# Patient Record
Sex: MALE | Race: WHITE | NOT HISPANIC OR LATINO | Employment: UNEMPLOYED | URBAN - METROPOLITAN AREA
[De-identification: names, ages, dates, MRNs, and addresses within clinical notes are randomized per-mention and may not be internally consistent; named-entity substitution may affect disease eponyms.]

---

## 2019-11-24 ENCOUNTER — HOSPITAL ENCOUNTER (OUTPATIENT)
Facility: MEDICAL CENTER | Age: 54
End: 2019-11-26
Attending: EMERGENCY MEDICINE | Admitting: SURGERY
Payer: COMMERCIAL

## 2019-11-24 ENCOUNTER — APPOINTMENT (OUTPATIENT)
Dept: RADIOLOGY | Facility: MEDICAL CENTER | Age: 54
End: 2019-11-24
Attending: EMERGENCY MEDICINE
Payer: COMMERCIAL

## 2019-11-24 DIAGNOSIS — G89.18 ACUTE POST-OPERATIVE PAIN: ICD-10-CM

## 2019-11-24 DIAGNOSIS — S82.831A: ICD-10-CM

## 2019-11-24 DIAGNOSIS — S82.244A NONDISPLACED SPIRAL FRACTURE OF SHAFT OF RIGHT TIBIA, INITIAL ENCOUNTER FOR CLOSED FRACTURE: ICD-10-CM

## 2019-11-24 PROCEDURE — 96376 TX/PRO/DX INJ SAME DRUG ADON: CPT

## 2019-11-24 PROCEDURE — 96374 THER/PROPH/DIAG INJ IV PUSH: CPT

## 2019-11-24 PROCEDURE — 700111 HCHG RX REV CODE 636 W/ 250 OVERRIDE (IP): Performed by: EMERGENCY MEDICINE

## 2019-11-24 PROCEDURE — 73590 X-RAY EXAM OF LOWER LEG: CPT | Mod: RT

## 2019-11-24 PROCEDURE — 96375 TX/PRO/DX INJ NEW DRUG ADDON: CPT

## 2019-11-24 PROCEDURE — 99285 EMERGENCY DEPT VISIT HI MDM: CPT

## 2019-11-24 RX ORDER — ONDANSETRON 2 MG/ML
4 INJECTION INTRAMUSCULAR; INTRAVENOUS EVERY 4 HOURS PRN
Status: DISCONTINUED | OUTPATIENT
Start: 2019-11-24 | End: 2019-11-26 | Stop reason: HOSPADM

## 2019-11-24 RX ORDER — OXYCODONE HYDROCHLORIDE 10 MG/1
10 TABLET ORAL
Status: DISCONTINUED | OUTPATIENT
Start: 2019-11-24 | End: 2019-11-26 | Stop reason: HOSPADM

## 2019-11-24 RX ORDER — MORPHINE SULFATE 4 MG/ML
4 INJECTION, SOLUTION INTRAMUSCULAR; INTRAVENOUS ONCE
Status: COMPLETED | OUTPATIENT
Start: 2019-11-24 | End: 2019-11-24

## 2019-11-24 RX ORDER — CELECOXIB 200 MG/1
200 CAPSULE ORAL 2 TIMES DAILY
Status: DISCONTINUED | OUTPATIENT
Start: 2019-11-24 | End: 2019-11-26 | Stop reason: HOSPADM

## 2019-11-24 RX ORDER — ACETAMINOPHEN 500 MG
1000 TABLET ORAL EVERY 6 HOURS
Status: DISCONTINUED | OUTPATIENT
Start: 2019-11-25 | End: 2019-11-26 | Stop reason: HOSPADM

## 2019-11-24 RX ORDER — ONDANSETRON 2 MG/ML
4 INJECTION INTRAMUSCULAR; INTRAVENOUS ONCE
Status: COMPLETED | OUTPATIENT
Start: 2019-11-24 | End: 2019-11-24

## 2019-11-24 RX ORDER — IBUPROFEN 800 MG/1
800 TABLET ORAL
Status: ON HOLD | COMMUNITY
End: 2019-11-26

## 2019-11-24 RX ORDER — OXYCODONE HYDROCHLORIDE 5 MG/1
5 TABLET ORAL
Status: DISCONTINUED | OUTPATIENT
Start: 2019-11-24 | End: 2019-11-26 | Stop reason: HOSPADM

## 2019-11-24 RX ADMIN — ONDANSETRON 4 MG: 2 INJECTION INTRAMUSCULAR; INTRAVENOUS at 22:35

## 2019-11-24 RX ADMIN — MORPHINE SULFATE 4 MG: 4 INJECTION INTRAVENOUS at 22:36

## 2019-11-24 RX ADMIN — MORPHINE SULFATE 4 MG: 4 INJECTION INTRAVENOUS at 23:17

## 2019-11-24 SDOH — HEALTH STABILITY: MENTAL HEALTH: HOW MANY STANDARD DRINKS CONTAINING ALCOHOL DO YOU HAVE ON A TYPICAL DAY?: 3 OR 4

## 2019-11-24 SDOH — HEALTH STABILITY: MENTAL HEALTH: HOW OFTEN DO YOU HAVE 6 OR MORE DRINKS ON ONE OCCASION?: MONTHLY

## 2019-11-24 SDOH — HEALTH STABILITY: MENTAL HEALTH: HOW OFTEN DO YOU HAVE A DRINK CONTAINING ALCOHOL?: 2-3 TIMES A WEEK

## 2019-11-25 ENCOUNTER — ANESTHESIA EVENT (OUTPATIENT)
Dept: SURGERY | Facility: MEDICAL CENTER | Age: 54
End: 2019-11-25
Payer: COMMERCIAL

## 2019-11-25 ENCOUNTER — APPOINTMENT (OUTPATIENT)
Dept: RADIOLOGY | Facility: MEDICAL CENTER | Age: 54
End: 2019-11-25
Attending: ORTHOPAEDIC SURGERY
Payer: COMMERCIAL

## 2019-11-25 ENCOUNTER — ANESTHESIA (OUTPATIENT)
Dept: SURGERY | Facility: MEDICAL CENTER | Age: 54
End: 2019-11-25
Payer: COMMERCIAL

## 2019-11-25 LAB
ANION GAP SERPL CALC-SCNC: 10 MMOL/L (ref 0–11.9)
BASOPHILS # BLD AUTO: 0.3 % (ref 0–1.8)
BASOPHILS # BLD: 0.04 K/UL (ref 0–0.12)
BUN SERPL-MCNC: 13 MG/DL (ref 8–22)
CALCIUM SERPL-MCNC: 8.6 MG/DL (ref 8.5–10.5)
CHLORIDE SERPL-SCNC: 106 MMOL/L (ref 96–112)
CO2 SERPL-SCNC: 22 MMOL/L (ref 20–33)
CREAT SERPL-MCNC: 1.05 MG/DL (ref 0.5–1.4)
EOSINOPHIL # BLD AUTO: 0.11 K/UL (ref 0–0.51)
EOSINOPHIL NFR BLD: 0.7 % (ref 0–6.9)
ERYTHROCYTE [DISTWIDTH] IN BLOOD BY AUTOMATED COUNT: 47 FL (ref 35.9–50)
GLUCOSE SERPL-MCNC: 113 MG/DL (ref 65–99)
HCT VFR BLD AUTO: 48.8 % (ref 42–52)
HGB BLD-MCNC: 16 G/DL (ref 14–18)
IMM GRANULOCYTES # BLD AUTO: 0.09 K/UL (ref 0–0.11)
IMM GRANULOCYTES NFR BLD AUTO: 0.6 % (ref 0–0.9)
INR PPP: 0.94 (ref 0.87–1.13)
LYMPHOCYTES # BLD AUTO: 2.12 K/UL (ref 1–4.8)
LYMPHOCYTES NFR BLD: 14.1 % (ref 22–41)
MCH RBC QN AUTO: 31.4 PG (ref 27–33)
MCHC RBC AUTO-ENTMCNC: 32.8 G/DL (ref 33.7–35.3)
MCV RBC AUTO: 95.9 FL (ref 81.4–97.8)
MONOCYTES # BLD AUTO: 1.05 K/UL (ref 0–0.85)
MONOCYTES NFR BLD AUTO: 7 % (ref 0–13.4)
NEUTROPHILS # BLD AUTO: 11.58 K/UL (ref 1.82–7.42)
NEUTROPHILS NFR BLD: 77.3 % (ref 44–72)
NRBC # BLD AUTO: 0 K/UL
NRBC BLD-RTO: 0 /100 WBC
PLATELET # BLD AUTO: 264 K/UL (ref 164–446)
PMV BLD AUTO: 9.5 FL (ref 9–12.9)
POTASSIUM SERPL-SCNC: 3.7 MMOL/L (ref 3.6–5.5)
PROTHROMBIN TIME: 12.8 SEC (ref 12–14.6)
RBC # BLD AUTO: 5.09 M/UL (ref 4.7–6.1)
SODIUM SERPL-SCNC: 138 MMOL/L (ref 135–145)
WBC # BLD AUTO: 15 K/UL (ref 4.8–10.8)

## 2019-11-25 PROCEDURE — A6222 GAUZE <=16 IN NO W/SAL W/O B: HCPCS | Performed by: ORTHOPAEDIC SURGERY

## 2019-11-25 PROCEDURE — 500054 HCHG BANDAGE, ELASTIC 6: Performed by: ORTHOPAEDIC SURGERY

## 2019-11-25 PROCEDURE — 160035 HCHG PACU - 1ST 60 MINS PHASE I: Performed by: ORTHOPAEDIC SURGERY

## 2019-11-25 PROCEDURE — 501838 HCHG SUTURE GENERAL: Performed by: ORTHOPAEDIC SURGERY

## 2019-11-25 PROCEDURE — A9270 NON-COVERED ITEM OR SERVICE: HCPCS | Performed by: ANESTHESIOLOGY

## 2019-11-25 PROCEDURE — 160048 HCHG OR STATISTICAL LEVEL 1-5: Performed by: ORTHOPAEDIC SURGERY

## 2019-11-25 PROCEDURE — 302875 HCHG BANDAGE ACE 4 OR 6""

## 2019-11-25 PROCEDURE — 73590 X-RAY EXAM OF LOWER LEG: CPT | Mod: RT

## 2019-11-25 PROCEDURE — 90715 TDAP VACCINE 7 YRS/> IM: CPT | Performed by: EMERGENCY MEDICINE

## 2019-11-25 PROCEDURE — C1713 ANCHOR/SCREW BN/BN,TIS/BN: HCPCS | Performed by: ORTHOPAEDIC SURGERY

## 2019-11-25 PROCEDURE — 110371 HCHG SHELL REV 272: Performed by: ORTHOPAEDIC SURGERY

## 2019-11-25 PROCEDURE — 160041 HCHG SURGERY MINUTES - EA ADDL 1 MIN LEVEL 4: Performed by: ORTHOPAEDIC SURGERY

## 2019-11-25 PROCEDURE — G0378 HOSPITAL OBSERVATION PER HR: HCPCS

## 2019-11-25 PROCEDURE — 700102 HCHG RX REV CODE 250 W/ 637 OVERRIDE(OP): Performed by: SURGERY

## 2019-11-25 PROCEDURE — 85025 COMPLETE CBC W/AUTO DIFF WBC: CPT

## 2019-11-25 PROCEDURE — 51798 US URINE CAPACITY MEASURE: CPT

## 2019-11-25 PROCEDURE — 700111 HCHG RX REV CODE 636 W/ 250 OVERRIDE (IP): Performed by: ANESTHESIOLOGY

## 2019-11-25 PROCEDURE — 700105 HCHG RX REV CODE 258: Performed by: ANESTHESIOLOGY

## 2019-11-25 PROCEDURE — 85610 PROTHROMBIN TIME: CPT

## 2019-11-25 PROCEDURE — 29515 APPLICATION SHORT LEG SPLINT: CPT

## 2019-11-25 PROCEDURE — 500881 HCHG PACK, EXTREMITY: Performed by: ORTHOPAEDIC SURGERY

## 2019-11-25 PROCEDURE — 503036 HCHG GUIDE PIN,OIC: Performed by: ORTHOPAEDIC SURGERY

## 2019-11-25 PROCEDURE — 160036 HCHG PACU - EA ADDL 30 MINS PHASE I: Performed by: ORTHOPAEDIC SURGERY

## 2019-11-25 PROCEDURE — 700101 HCHG RX REV CODE 250: Performed by: ANESTHESIOLOGY

## 2019-11-25 PROCEDURE — 700102 HCHG RX REV CODE 250 W/ 637 OVERRIDE(OP): Performed by: ANESTHESIOLOGY

## 2019-11-25 PROCEDURE — 160002 HCHG RECOVERY MINUTES (STAT): Performed by: ORTHOPAEDIC SURGERY

## 2019-11-25 PROCEDURE — 80048 BASIC METABOLIC PNL TOTAL CA: CPT

## 2019-11-25 PROCEDURE — 160029 HCHG SURGERY MINUTES - 1ST 30 MINS LEVEL 4: Performed by: ORTHOPAEDIC SURGERY

## 2019-11-25 PROCEDURE — 160022 HCHG BLOCK: Performed by: ORTHOPAEDIC SURGERY

## 2019-11-25 PROCEDURE — A6402 STERILE GAUZE <= 16 SQ IN: HCPCS | Performed by: ORTHOPAEDIC SURGERY

## 2019-11-25 PROCEDURE — 160009 HCHG ANES TIME/MIN: Performed by: ORTHOPAEDIC SURGERY

## 2019-11-25 PROCEDURE — 90471 IMMUNIZATION ADMIN: CPT

## 2019-11-25 PROCEDURE — 700111 HCHG RX REV CODE 636 W/ 250 OVERRIDE (IP): Performed by: EMERGENCY MEDICINE

## 2019-11-25 PROCEDURE — A9270 NON-COVERED ITEM OR SERVICE: HCPCS | Performed by: SURGERY

## 2019-11-25 DEVICE — SCREW CROSS LOCK 5MM X 60MM (4TX5=20): Type: IMPLANTABLE DEVICE | Site: TIBIA | Status: FUNCTIONAL

## 2019-11-25 DEVICE — SCREW CROSS LOCK 5MM X 70MM (4TX5=20): Type: IMPLANTABLE DEVICE | Site: TIBIA | Status: FUNCTIONAL

## 2019-11-25 DEVICE — SCREW CANN 4.0X44 SHORT OIC (3TX3+2TX2=13): Type: IMPLANTABLE DEVICE | Site: TIBIA | Status: FUNCTIONAL

## 2019-11-25 DEVICE — IMPLANTABLE DEVICE: Type: IMPLANTABLE DEVICE | Site: TIBIA | Status: FUNCTIONAL

## 2019-11-25 DEVICE — NAIL TIBIAL 10MM X 340MM (2TX1=2): Type: IMPLANTABLE DEVICE | Site: TIBIA | Status: FUNCTIONAL

## 2019-11-25 RX ORDER — OXYCODONE HCL 5 MG/5 ML
10 SOLUTION, ORAL ORAL
Status: COMPLETED | OUTPATIENT
Start: 2019-11-25 | End: 2019-11-25

## 2019-11-25 RX ORDER — LIDOCAINE HYDROCHLORIDE 20 MG/ML
INJECTION, SOLUTION EPIDURAL; INFILTRATION; INTRACAUDAL; PERINEURAL PRN
Status: DISCONTINUED | OUTPATIENT
Start: 2019-11-25 | End: 2019-11-25 | Stop reason: SURG

## 2019-11-25 RX ORDER — HYDROMORPHONE HYDROCHLORIDE 1 MG/ML
0.4 INJECTION, SOLUTION INTRAMUSCULAR; INTRAVENOUS; SUBCUTANEOUS
Status: DISCONTINUED | OUTPATIENT
Start: 2019-11-25 | End: 2019-11-25 | Stop reason: HOSPADM

## 2019-11-25 RX ORDER — MEPERIDINE HYDROCHLORIDE 25 MG/ML
12.5 INJECTION INTRAMUSCULAR; INTRAVENOUS; SUBCUTANEOUS
Status: DISCONTINUED | OUTPATIENT
Start: 2019-11-25 | End: 2019-11-25 | Stop reason: HOSPADM

## 2019-11-25 RX ORDER — CEFAZOLIN SODIUM 1 G/3ML
INJECTION, POWDER, FOR SOLUTION INTRAMUSCULAR; INTRAVENOUS PRN
Status: DISCONTINUED | OUTPATIENT
Start: 2019-11-25 | End: 2019-11-25 | Stop reason: SURG

## 2019-11-25 RX ORDER — OXYCODONE HCL 5 MG/5 ML
5 SOLUTION, ORAL ORAL
Status: COMPLETED | OUTPATIENT
Start: 2019-11-25 | End: 2019-11-25

## 2019-11-25 RX ORDER — SODIUM CHLORIDE, SODIUM GLUCONATE, SODIUM ACETATE, POTASSIUM CHLORIDE AND MAGNESIUM CHLORIDE 526; 502; 368; 37; 30 MG/100ML; MG/100ML; MG/100ML; MG/100ML; MG/100ML
500 INJECTION, SOLUTION INTRAVENOUS CONTINUOUS
Status: DISCONTINUED | OUTPATIENT
Start: 2019-11-25 | End: 2019-11-25 | Stop reason: HOSPADM

## 2019-11-25 RX ORDER — HYDROMORPHONE HYDROCHLORIDE 1 MG/ML
0.2 INJECTION, SOLUTION INTRAMUSCULAR; INTRAVENOUS; SUBCUTANEOUS
Status: DISCONTINUED | OUTPATIENT
Start: 2019-11-25 | End: 2019-11-25 | Stop reason: HOSPADM

## 2019-11-25 RX ORDER — HALOPERIDOL 5 MG/ML
1 INJECTION INTRAMUSCULAR
Status: DISCONTINUED | OUTPATIENT
Start: 2019-11-25 | End: 2019-11-25 | Stop reason: HOSPADM

## 2019-11-25 RX ORDER — ROPIVACAINE HYDROCHLORIDE 5 MG/ML
INJECTION, SOLUTION EPIDURAL; INFILTRATION; PERINEURAL PRN
Status: DISCONTINUED | OUTPATIENT
Start: 2019-11-25 | End: 2019-11-25 | Stop reason: SURG

## 2019-11-25 RX ORDER — MIDAZOLAM HYDROCHLORIDE 1 MG/ML
INJECTION INTRAMUSCULAR; INTRAVENOUS PRN
Status: DISCONTINUED | OUTPATIENT
Start: 2019-11-25 | End: 2019-11-25 | Stop reason: SURG

## 2019-11-25 RX ORDER — IPRATROPIUM BROMIDE AND ALBUTEROL SULFATE 2.5; .5 MG/3ML; MG/3ML
3 SOLUTION RESPIRATORY (INHALATION)
Status: DISCONTINUED | OUTPATIENT
Start: 2019-11-25 | End: 2019-11-25 | Stop reason: HOSPADM

## 2019-11-25 RX ORDER — LIDOCAINE HYDROCHLORIDE 40 MG/ML
SOLUTION TOPICAL PRN
Status: DISCONTINUED | OUTPATIENT
Start: 2019-11-25 | End: 2019-11-25 | Stop reason: SURG

## 2019-11-25 RX ORDER — SODIUM CHLORIDE, SODIUM LACTATE, POTASSIUM CHLORIDE, CALCIUM CHLORIDE 600; 310; 30; 20 MG/100ML; MG/100ML; MG/100ML; MG/100ML
INJECTION, SOLUTION INTRAVENOUS
Status: DISCONTINUED | OUTPATIENT
Start: 2019-11-25 | End: 2019-11-25 | Stop reason: SURG

## 2019-11-25 RX ORDER — ONDANSETRON 2 MG/ML
4 INJECTION INTRAMUSCULAR; INTRAVENOUS
Status: DISCONTINUED | OUTPATIENT
Start: 2019-11-25 | End: 2019-11-25 | Stop reason: HOSPADM

## 2019-11-25 RX ORDER — HYDROMORPHONE HYDROCHLORIDE 1 MG/ML
1 INJECTION, SOLUTION INTRAMUSCULAR; INTRAVENOUS; SUBCUTANEOUS
Status: DISCONTINUED | OUTPATIENT
Start: 2019-11-25 | End: 2019-11-25 | Stop reason: HOSPADM

## 2019-11-25 RX ORDER — DEXAMETHASONE SODIUM PHOSPHATE 4 MG/ML
INJECTION, SOLUTION INTRA-ARTICULAR; INTRALESIONAL; INTRAMUSCULAR; INTRAVENOUS; SOFT TISSUE PRN
Status: DISCONTINUED | OUTPATIENT
Start: 2019-11-25 | End: 2019-11-25 | Stop reason: SURG

## 2019-11-25 RX ORDER — MIDAZOLAM HYDROCHLORIDE 1 MG/ML
1 INJECTION INTRAMUSCULAR; INTRAVENOUS
Status: DISCONTINUED | OUTPATIENT
Start: 2019-11-25 | End: 2019-11-25 | Stop reason: HOSPADM

## 2019-11-25 RX ORDER — DIPHENHYDRAMINE HYDROCHLORIDE 50 MG/ML
12.5 INJECTION INTRAMUSCULAR; INTRAVENOUS
Status: DISCONTINUED | OUTPATIENT
Start: 2019-11-25 | End: 2019-11-25 | Stop reason: HOSPADM

## 2019-11-25 RX ORDER — ESMOLOL HYDROCHLORIDE 10 MG/ML
INJECTION INTRAVENOUS PRN
Status: DISCONTINUED | OUTPATIENT
Start: 2019-11-25 | End: 2019-11-25 | Stop reason: SURG

## 2019-11-25 RX ORDER — SODIUM CHLORIDE, SODIUM GLUCONATE, SODIUM ACETATE, POTASSIUM CHLORIDE AND MAGNESIUM CHLORIDE 526; 502; 368; 37; 30 MG/100ML; MG/100ML; MG/100ML; MG/100ML; MG/100ML
INJECTION, SOLUTION INTRAVENOUS
Status: DISCONTINUED | OUTPATIENT
Start: 2019-11-25 | End: 2019-11-25 | Stop reason: SURG

## 2019-11-25 RX ADMIN — OXYCODONE HYDROCHLORIDE 5 MG: 5 TABLET ORAL at 15:59

## 2019-11-25 RX ADMIN — FENTANYL CITRATE 50 MCG: 50 INJECTION, SOLUTION INTRAMUSCULAR; INTRAVENOUS at 12:23

## 2019-11-25 RX ADMIN — HYDROMORPHONE HYDROCHLORIDE 0.2 MG: 1 INJECTION, SOLUTION INTRAMUSCULAR; INTRAVENOUS; SUBCUTANEOUS at 13:55

## 2019-11-25 RX ADMIN — CLOSTRIDIUM TETANI TOXOID ANTIGEN (FORMALDEHYDE INACTIVATED), CORYNEBACTERIUM DIPHTHERIAE TOXOID ANTIGEN (FORMALDEHYDE INACTIVATED), BORDETELLA PERTUSSIS TOXOID ANTIGEN (GLUTARALDEHYDE INACTIVATED), BORDETELLA PERTUSSIS FILAMENTOUS HEMAGGLUTININ ANTIGEN (FORMALDEHYDE INACTIVATED), BORDETELLA PERTUSSIS PERTACTIN ANTIGEN, AND BORDETELLA PERTUSSIS FIMBRIAE 2/3 ANTIGEN 0.5 ML: 5; 2; 2.5; 5; 3; 5 INJECTION, SUSPENSION INTRAMUSCULAR at 04:05

## 2019-11-25 RX ADMIN — CEFAZOLIN 2 G: 330 INJECTION, POWDER, FOR SOLUTION INTRAMUSCULAR; INTRAVENOUS at 11:36

## 2019-11-25 RX ADMIN — FENTANYL CITRATE 25 MCG: 0.05 INJECTION, SOLUTION INTRAMUSCULAR; INTRAVENOUS at 13:10

## 2019-11-25 RX ADMIN — MIDAZOLAM 2 MG: 1 INJECTION INTRAMUSCULAR; INTRAVENOUS at 11:38

## 2019-11-25 RX ADMIN — HYDROMORPHONE HYDROCHLORIDE 0.2 MG: 1 INJECTION, SOLUTION INTRAMUSCULAR; INTRAVENOUS; SUBCUTANEOUS at 13:45

## 2019-11-25 RX ADMIN — CELECOXIB 200 MG: 200 CAPSULE ORAL at 17:32

## 2019-11-25 RX ADMIN — ACETAMINOPHEN 1000 MG: 500 TABLET ORAL at 21:27

## 2019-11-25 RX ADMIN — FENTANYL CITRATE 25 MCG: 0.05 INJECTION, SOLUTION INTRAMUSCULAR; INTRAVENOUS at 13:05

## 2019-11-25 RX ADMIN — ACETAMINOPHEN 1000 MG: 500 TABLET ORAL at 14:50

## 2019-11-25 RX ADMIN — HYDROMORPHONE HYDROCHLORIDE 0.2 MG: 1 INJECTION, SOLUTION INTRAMUSCULAR; INTRAVENOUS; SUBCUTANEOUS at 13:40

## 2019-11-25 RX ADMIN — HYDROMORPHONE HYDROCHLORIDE 0.2 MG: 1 INJECTION, SOLUTION INTRAMUSCULAR; INTRAVENOUS; SUBCUTANEOUS at 13:50

## 2019-11-25 RX ADMIN — ROCURONIUM BROMIDE 50 MG: 10 INJECTION, SOLUTION INTRAVENOUS at 11:48

## 2019-11-25 RX ADMIN — OXYCODONE HYDROCHLORIDE 5 MG: 5 TABLET ORAL at 01:10

## 2019-11-25 RX ADMIN — ACETAMINOPHEN 1000 MG: 500 TABLET ORAL at 01:10

## 2019-11-25 RX ADMIN — SODIUM CHLORIDE, POTASSIUM CHLORIDE, SODIUM LACTATE AND CALCIUM CHLORIDE: 600; 310; 30; 20 INJECTION, SOLUTION INTRAVENOUS at 11:36

## 2019-11-25 RX ADMIN — LIDOCAINE HYDROCHLORIDE 4 ML: 40 SOLUTION TOPICAL at 11:48

## 2019-11-25 RX ADMIN — OXYCODONE HYDROCHLORIDE 5 MG: 5 TABLET ORAL at 04:09

## 2019-11-25 RX ADMIN — ESMOLOL HYDROCHLORIDE 50 MG: 100 INJECTION, SOLUTION INTRAVENOUS at 11:50

## 2019-11-25 RX ADMIN — HYDROMORPHONE HYDROCHLORIDE 0.2 MG: 1 INJECTION, SOLUTION INTRAMUSCULAR; INTRAVENOUS; SUBCUTANEOUS at 13:20

## 2019-11-25 RX ADMIN — LIDOCAINE HYDROCHLORIDE 50 MG: 20 INJECTION, SOLUTION EPIDURAL; INFILTRATION; INTRACAUDAL at 11:48

## 2019-11-25 RX ADMIN — HYDROMORPHONE HYDROCHLORIDE 0.2 MG: 1 INJECTION, SOLUTION INTRAMUSCULAR; INTRAVENOUS; SUBCUTANEOUS at 13:15

## 2019-11-25 RX ADMIN — ACETAMINOPHEN 1000 MG: 500 TABLET ORAL at 06:34

## 2019-11-25 RX ADMIN — HYDROMORPHONE HYDROCHLORIDE 0.2 MG: 1 INJECTION, SOLUTION INTRAMUSCULAR; INTRAVENOUS; SUBCUTANEOUS at 13:30

## 2019-11-25 RX ADMIN — ROPIVACAINE HYDROCHLORIDE 20 ML: 5 INJECTION, SOLUTION EPIDURAL; INFILTRATION; PERINEURAL at 12:50

## 2019-11-25 RX ADMIN — SODIUM CHLORIDE, SODIUM GLUCONATE, SODIUM ACETATE, POTASSIUM CHLORIDE AND MAGNESIUM CHLORIDE: 526; 502; 368; 37; 30 INJECTION, SOLUTION INTRAVENOUS at 12:30

## 2019-11-25 RX ADMIN — OXYCODONE HYDROCHLORIDE 10 MG: 5 SOLUTION ORAL at 13:03

## 2019-11-25 RX ADMIN — FENTANYL CITRATE 50 MCG: 50 INJECTION, SOLUTION INTRAMUSCULAR; INTRAVENOUS at 13:35

## 2019-11-25 RX ADMIN — OXYCODONE HYDROCHLORIDE 10 MG: 10 TABLET ORAL at 21:27

## 2019-11-25 RX ADMIN — HYDROMORPHONE HYDROCHLORIDE 0.4 MG: 1 INJECTION, SOLUTION INTRAMUSCULAR; INTRAVENOUS; SUBCUTANEOUS at 13:25

## 2019-11-25 RX ADMIN — HYDROMORPHONE HYDROCHLORIDE 0.2 MG: 1 INJECTION, SOLUTION INTRAMUSCULAR; INTRAVENOUS; SUBCUTANEOUS at 14:00

## 2019-11-25 RX ADMIN — DEXAMETHASONE SODIUM PHOSPHATE 8 MG: 4 INJECTION, SOLUTION INTRA-ARTICULAR; INTRALESIONAL; INTRAMUSCULAR; INTRAVENOUS; SOFT TISSUE at 11:53

## 2019-11-25 RX ADMIN — PROPOFOL 150 MG: 10 INJECTION, EMULSION INTRAVENOUS at 11:48

## 2019-11-25 RX ADMIN — FENTANYL CITRATE 100 MCG: 50 INJECTION, SOLUTION INTRAMUSCULAR; INTRAVENOUS at 11:38

## 2019-11-25 ASSESSMENT — COGNITIVE AND FUNCTIONAL STATUS - GENERAL
TURNING FROM BACK TO SIDE WHILE IN FLAT BAD: A LITTLE
SUGGESTED CMS G CODE MODIFIER DAILY ACTIVITY: CI
DAILY ACTIVITIY SCORE: 23
CLIMB 3 TO 5 STEPS WITH RAILING: TOTAL
SUGGESTED CMS G CODE MODIFIER MOBILITY: CL
MOVING FROM LYING ON BACK TO SITTING ON SIDE OF FLAT BED: A LOT
DRESSING REGULAR LOWER BODY CLOTHING: A LITTLE
STANDING UP FROM CHAIR USING ARMS: A LOT
MOVING TO AND FROM BED TO CHAIR: A LITTLE
MOBILITY SCORE: 13
WALKING IN HOSPITAL ROOM: A LOT

## 2019-11-25 ASSESSMENT — PATIENT HEALTH QUESTIONNAIRE - PHQ9
1. LITTLE INTEREST OR PLEASURE IN DOING THINGS: NOT AT ALL
2. FEELING DOWN, DEPRESSED, IRRITABLE, OR HOPELESS: NOT AT ALL
SUM OF ALL RESPONSES TO PHQ9 QUESTIONS 1 AND 2: 0

## 2019-11-25 ASSESSMENT — LIFESTYLE VARIABLES
HAVE YOU EVER FELT YOU SHOULD CUT DOWN ON YOUR DRINKING: NO
EVER_SMOKED: YES
AVERAGE NUMBER OF DAYS PER WEEK YOU HAVE A DRINK CONTAINING ALCOHOL: 3
EVER HAD A DRINK FIRST THING IN THE MORNING TO STEADY YOUR NERVES TO GET RID OF A HANGOVER: NO
HAVE PEOPLE ANNOYED YOU BY CRITICIZING YOUR DRINKING: NO
TOTAL SCORE: 0
EVER FELT BAD OR GUILTY ABOUT YOUR DRINKING: NO
CONSUMPTION TOTAL: NEGATIVE
ALCOHOL_USE: YES
DOES PATIENT WANT TO STOP DRINKING: NO
HOW MANY TIMES IN THE PAST YEAR HAVE YOU HAD 5 OR MORE DRINKS IN A DAY: 0
TOTAL SCORE: 0
ON A TYPICAL DAY WHEN YOU DRINK ALCOHOL HOW MANY DRINKS DO YOU HAVE: 2
TOTAL SCORE: 0

## 2019-11-25 ASSESSMENT — PAIN SCALES - GENERAL: PAIN_LEVEL: 5

## 2019-11-25 NOTE — CONSULTS
DATE OF SERVICE:  11/25/2019    ORTHOPEDIC CONSULTATION    CHIEF COMPLAINT:  Right leg pain.    HISTORY OF PRESENT ILLNESS:  The patient is a 53-year-old gentleman who   presents with a low energy ground level fall while rollerskating in the indoor   roller skating rink locally.  He had inability to bear weight, he noticed   deformity.  He was brought to [facility] where he was   found to have a right tibia fracture.  I was consulted as the orthopedic   surgeon on-call.  The patient denies any bleeding.  He reports very   superficial abrasion about his leg.  His pain is tolerable, approximately 5/10   in severity.  It has been somewhat amenable to splinting, ice, and pain   medication.  He denies prior trauma or surgery about his right lower   extremity.  He denies any numbness to his right lower extremity, denies pain   elsewhere in his extremities.    PAST MEDICAL HISTORY:  None.    PAST SURGICAL HISTORY:  He denies any prior right lower extremity surgery.    MEDICATIONS:  Ibuprofen.    ALLERGIES:  No known drug allergies.    FAMILY HISTORY:  Negative for bleeding disorders or anesthetic reactions.    SOCIAL HISTORY:  He reports smoking one pack of cigarettes daily, drinks   alcohol daily, smokes marijuana occasionally.    REVIEW OF SYSTEMS:  Thorough review of systems is performed and is negative   except for past medical history and history of present illness.    PHYSICAL EXAMINATION:  GENERAL:  He is alert and oriented x4.  HEENT:  Normocephalic, atraumatic.  NEUROLOGIC:  Cranial nerves II-XII are grossly intact.  CARDIOVASCULAR:  2+ distal pulses.  EXTREMITIES:  No cyanosis, clubbing, or edema.  PULMONARY:  Breathing comfortably on room air without labor.  ABDOMEN:  Obese, otherwise unremarkable.  SKIN:  No rashes, jaundice, or cyanosis.  SPINE:  Clinically midline.  GAIT:  Currently nonambulatory in a hospital bed.  MUSCULOSKELETAL:  Bilateral upper extremities and left lower extremity, no    tenderness to palpation, pain with range of motion.  Right lower extremity, he   is in a well-padded splint.  He has mild tenderness to palpation about the   proximal fibula and about the proximal tibia joint line.  He has no tenderness   in his right thigh.  His right leg compartments are soft.  He has no pain   whatsoever with passive stretch of the toes.  NEUROLOGIC:  Right lower extremity, sensation intact to light touch at L4, L5,   S1 dermatomes.  He fires EHL and FHL with good strength.    IMAGING:  Multiple views of the right tibia demonstrates a distal third spiral   oblique fracture with very small posterior malleolus fracture and a proximal   fibular fracture.    ASSESSMENT:  A 53-year-old male with a right tibia -- fibula fracture.    PLAN:  Recommend admission for pain control and taken to operating room for   tibial nailing with Dr. Lund on 11/25/2019.  The patient understands this   plan and wishes to proceed.  Continue n.p.o.       ____________________________________     MD HOLLEY Chao / DIANA    DD:  11/25/2019 06:56:40  DT:  11/25/2019 08:21:17    D#:  [id number]  Job#:  [id number]

## 2019-11-25 NOTE — CONSULTS
Ortho:    Full consult dictated. Plan for OR today with Dr. Lund for tibial nailing of distal third tibia fracture with small posterior malleolus fracture and proximal fibula fracture. Patient is NPO. He understands this plan and wishes to proceed.

## 2019-11-25 NOTE — ANESTHESIA PREPROCEDURE EVALUATION
Relevant Problems   No relevant active problems       Physical Exam    Airway   Mallampati: III  TM distance: >3 FB  Neck ROM: full       Cardiovascular - normal exam  Rhythm: regular  Rate: normal  (-) murmur     Dental - normal exam         Pulmonary - normal exam  Breath sounds clear to auscultation     Abdominal    Neurological - normal exam         Other findings: Obesity, ? SUZANNE, RLE fracture            Anesthesia Plan    ASA 2       Plan - general       Airway plan will be LMA  (Post op fem nerve block for post op pain control)      Induction: intravenous    Postoperative Plan: Postoperative administration of opioids is intended.    Pertinent diagnostic labs and testing reviewed    Informed Consent:    Anesthetic plan and risks discussed with patient.    Use of blood products discussed with: patient whom consented to blood products.

## 2019-11-25 NOTE — ANESTHESIA TIME REPORT
Anesthesia Start and Stop Event Times     Date Time Event    11/25/2019 1115 Ready for Procedure     1136 Anesthesia Start     1257 Anesthesia Stop        Responsible Staff  11/25/19    Name Role Begin End    Jatin Smith III, M.D. Anesth 1136 1257        Preop Diagnosis (Free Text):  Pre-op Diagnosis     right tibial fracture        Preop Diagnosis (Codes):    Post op Diagnosis  Closed tibia fracture      Premium Reason  Non-Premium    Comments:

## 2019-11-25 NOTE — ED PROVIDER NOTES
ED Provider Note    CHIEF COMPLAINT  Chief Complaint   Patient presents with   • Leg Injury       HPI  Antoni Foster is a 53 y.o. male who presents with right lower extremity pain following a fall while at an indoor rollerskating rink.  Has pain to the distal tibial region.  Has a slight abrasion there however no protruding bone or active bleeding.  Last tetanus was about 7 years ago.  Denies any other injuries such as head injury, neck pain, back pain.  No hip pain.  No upper extremity pain.  No left lower extremity pain.  Denies any numbness or tingling distally.    REVIEW OF SYSTEMS  See HPI for further details. All other systems are negative.     PAST MEDICAL HISTORY   Denies infant past medical history such as diabetes, heart disease    SOCIAL HISTORY  Social History     Tobacco Use   • Smoking status: Current Every Day Smoker     Packs/day: 1.00     Types: Cigarettes   • Smokeless tobacco: Never Used   Substance and Sexual Activity   • Alcohol use: Yes     Alcohol/week: 2.4 oz     Types: 4 Cans of beer per week     Frequency: 2-3 times a week     Drinks per session: 3 or 4     Binge frequency: Monthly   • Drug use: Yes     Types: Inhaled     Comment: Marijuana   • Sexual activity: Not on file       SURGICAL HISTORY   has a past surgical history that includes other orthopedic surgery.    CURRENT MEDICATIONS  Home Medications     Reviewed by Carlos Alberto Mace R.N. (Registered Nurse) on 11/24/19 at 2151  Med List Status: Complete   Medication Last Dose Status   ibuprofen (MOTRIN) 800 MG Tab 11/24/2019 Active                ALLERGIES  Not on File    PHYSICAL EXAM  VITAL SIGNS: BP (!) 97/71   Pulse 98   Temp 36.4 °C (97.5 °F) (Temporal)   Ht 1.829 m (6')   Wt 115.7 kg (255 lb)   SpO2 90%   BMI 34.58 kg/m²    Pulse ox interpretation: I interpret this pulse ox as normal.  Constitutional: Alert in no apparent distress.  HENT: No signs of trauma, Bilateral external ears normal, Nose normal.   Eyes: Pupils are equal and  reactive, Conjunctiva normal, Non-icteric.   Neck: Normal range of motion, No tenderness, Supple, No stridor.   Cardiovascular: Regular rate and rhythm.   Thorax & Lungs: Normal breath sounds, No respiratory distress,  Skin: Warm, Dry, No erythema, No rash.   Back: No bony tenderness, No CVA tenderness.   Extremities: Intact distal pulses, No edema, right lower leg tenderness to the distal tibial region.  No tenderness at the medial or lateral malleoli, no tenderness along the proximal fibula, no cyanosis  Musculoskeletal: Pain with range of motion to the ankle. No major deformities noted.   Neurologic: Alert, No focal deficits noted.       DIAGNOSTIC STUDIES / PROCEDURES      LABS  Labs Reviewed   BASIC METABOLIC PANEL   CBC WITHOUT DIFFERENTIAL   PROTHROMBIN TIME       RADIOLOGY  DX-TIBIA AND FIBULA RIGHT   Final Result      1.  Acute, minimally comminuted proximal fibular diaphyseal fracture.   2.  Acute, obliquely oriented distal tibial diaphyseal fracture.   3.  Radiolucent line projecting over the posterior malleolus, which may be due to a nondisplaced posterior malleolar fracture.          COURSE & MEDICAL DECISION MAKING    Medications   tetanus-dipth-acell pertussis (ADACEL) inj 0.5 mL (has no administration in time range)   Pharmacy Consult Request ...Pain Management Review 1 Each (has no administration in time range)   acetaminophen (TYLENOL) tablet 1,000 mg (has no administration in time range)   celecoxib (CELEBREX) capsule 200 mg (has no administration in time range)   oxyCODONE immediate-release (ROXICODONE) tablet 5 mg (has no administration in time range)   oxyCODONE immediate-release (ROXICODONE) tablet 10 mg (has no administration in time range)   fentaNYL (SUBLIMAZE) injection 50 mcg (has no administration in time range)   ondansetron (ZOFRAN) syringe/vial injection 4 mg (has no administration in time range)   morphine (pf) 4 MG/ML injection 4 mg (4 mg Intravenous Given 11/24/19 3080)    ondansetron (ZOFRAN) syringe/vial injection 4 mg (4 mg Intravenous Given 11/24/19 2431)   morphine (pf) 4 MG/ML injection 4 mg (4 mg Intravenous Given 11/24/19 1407)       Pertinent Labs & Imaging studies reviewed. (See chart for details)  53 y.o. male presenting with right lower extremity pain following a fall while rollerskating at a roller rink.  Has a slight abrasion however this is not consistent with an open fracture.  Neurovascularly intact distally.    The patient is a resident of [facility].  He denies any significant medical history.  Denies chronic medication use.  Denies any known cardiovascular disease/pulmonary disease history    X-ray was performed showing distal tibial spiral fracture and proximal fibular fracture.  Spoke with Dr. Huston from orthopedic surgery and recommending admission for surgical management.    He was given a tetanus update and wound was irrigated and dressed.  Patient was also placed in a 3 sided short leg splint.  N.p.o. after midnight.  He was given IV analgesia for pain control.      BP (!) 97/71   Pulse 98   Temp 36.4 °C (97.5 °F) (Temporal)   Ht 1.829 m (6')   Wt 115.7 kg (255 lb)   SpO2 90%   BMI 34.58 kg/m²       FINAL IMPRESSION  1. Nondisplaced spiral fracture of shaft of right tibia, initial encounter for closed fracture    2. Fracture of head of fibula, right, closed, initial encounter            Electronically signed by: Johny Sim, 11/24/2019 10:18 PM

## 2019-11-25 NOTE — ED TRIAGE NOTES
Report per Pedro FREITAS: patient fell while roller skating at wife's birthday party.  Injury to right lower left with obvious deformity.  POMS intact.  Vacuum splint placed.  200mcg Fentanyl and 4mg Zofran given en route.    2/10 pain, 20G right hand, 116/70, 84% RA, 99 SR.      Patient has ha done 24oz beer this evening.

## 2019-11-25 NOTE — ANESTHESIA PROCEDURE NOTES
Airway  Performed by: Jatin Smith III, M.D.  Authorized by: Jatin Smith III, M.D.     Location:  OR  Urgency:  Elective  Difficult Airway: No    Indications for Airway Management:  Anesthesia  Spontaneous Ventilation: absent    Sedation Level:  Deep  Preoxygenated: Yes    Patient Position:  Sniffing  Final Airway Type:  Endotracheal airway  Final Endotracheal Airway:  ETT  Cuffed: Yes    Technique Used for Successful ETT Placement:  Direct laryngoscopy  Insertion Site:  Oral  Blade Type:  Tatum  Laryngoscope Blade/Videolaryngoscope Blade Size:  3  ETT Size (mm):  8.0  Measured from:  Lips  ETT to Lips (cm):  23  Placement Verified by: auscultation and capnometry    Cormack-Lehane Classification:  Grade IIb - view of arytenoids or posterior of glottis only  Number of Attempts at Approach:  1

## 2019-11-25 NOTE — ADDENDUM NOTE
Addendum  created 11/25/19 1323 by Jatin Smith III, M.D.    Child order released for a procedure order, Clinical Note Signed, Intraprocedure Blocks edited, Intraprocedure Meds edited

## 2019-11-25 NOTE — ANESTHESIA PROCEDURE NOTES
Peripheral IV  Date/Time: 11/25/2019 11:58 AM  Performed by: Jatin Smith III, M.D.  Authorized by: Jatin Smith III, M.D.     Size:  18 G  Laterality:  Left (forearm)  Local Anesthetic:  None  Site Prep:  Alcohol  Technique:  Direct puncture  Attempts:  2

## 2019-11-25 NOTE — ANESTHESIA QCDR
2019 [facility] Clinical Data Registry (for Quality Improvement)     Postoperative nausea/vomiting risk protocol (Adult = 18 yrs and Pediatric 3-17 yrs)- (430 and 463)  General inhalation anesthetic (NOT TIVA) with PONV risk factors: Yes  Provision of anti-emetic therapy with at least 2 different classes of agents: Yes   Patient DID NOT receive anti-emetic therapy and reason is documented in Medical Record:  N/A    Multimodal Pain Management- (AQI59)  Patient undergoing Elective Surgery (i.e. Outpatient, or ASC, or Prescheduled Surgery prior to Hospital Admission): No  Use of Multimodal Pain Management, two or more drugs and/or interventions, NOT including systemic opioids: N/A  Exception: Documented allergy to multiple classes of analgesics: N/A    PACU assessment of acute postoperative pain prior to Anesthesia Care End- Applies to Patients Age = 18- (ABG7)  Initial PACU pain score is which of the following: < 7/10  Patient unable to report pain score: N/A    Post-anesthetic transfer of care checklist/protocol to PACU/ICU- (426 and 427)  Upon conclusion of case, patient transferred to which of the following locations: PACU/Non-ICU  Use of transfer checklist/protocol: Yes  Exclusion: Service Performed in Patient Hospital Room (and thus did not require transfer): N/A    PACU Reintubation- (AQI31)  General anesthesia requiring endotracheal intubation (ETT) along with subsequent extubation in OR or PACU: No  Required reintubation in the PACU: N/A  Extubation was a planned trial documented in the medical record prior to removal of the original airway device: N/A    Unplanned admission to ICU related to anesthesia service up through end of PACU care- (MD51)  Unplanned admission to ICU (not initially anticipated at anesthesia start time): No

## 2019-11-25 NOTE — ANESTHESIA PROCEDURE NOTES
Peripheral Block under GA  Date/Time: 11/25/2019 12:47 PM  Performed by: Jatin Smith III, M.D.  Authorized by: Jatin Smith III, M.D.     Patient Location:  OR  Start Time:  11/25/2019 12:47 PM  End Time:  11/25/2019 12:50 PM  Reason for Block: at surgeon's request and post-op pain management    patient identified, IV checked, site marked, risks and benefits discussed, surgical consent, monitors and equipment checked, pre-op evaluation and timeout performed    Patient Position:  Supine  Prep: ChloraPrep    Monitoring:  Heart rate, continuous pulse ox and cardiac monitor  Block Region:  Lower Extremity  Lower Extremity - Block Type:  Selective FEMORAL nerve block at the Adductor Canal    Laterality:  Right  Procedures: ultrasound guided  Image captured, interpreted and electronically stored.  Local Infiltration:  Lidocaine  Strength:  1 %  Dose:  3 ml  Block Type:  Single-shot  Needle Length:  100mm  Needle Gauge:  21 G  Needle Localization:  Ultrasound guidance  Injection Assessment:  Negative aspiration for heme, no paresthesia on injection, incremental injection and local visualized surrounding nerve on ultrasound   US Guided Selective Femoral Nerve Block at Adductor Canal:   US probe placed at mid-thigh level on externally rotated leg and femur identified.  Probe directed medially until Sartorius Muscle (SM), Femoral Artery (FA) and Saphenous Nerve (SN) identified in Adductor Canal (AC).  Needle inserted anterolateral to probe in an in plane approach into a subsartorial perivascular perineural position.  After negative aspiration LA injected with ease and visualized spreading within the AC.

## 2019-11-25 NOTE — CARE PLAN
Problem: Safety  Goal: Will remain free from falls  Outcome: PROGRESSING AS EXPECTED  Note:   Patient has call light in reach, knows to call for assistance. Room uncluttered and walkways are empty.      Problem: Infection  Goal: Will remain free from infection  Outcome: PROGRESSING AS EXPECTED     Problem: Venous Thromboembolism (VTW)/Deep Vein Thrombosis (DVT) Prevention:  Goal: Patient will participate in Venous Thrombosis (VTE)/Deep Vein Thrombosis (DVT)Prevention Measures  Outcome: PROGRESSING AS EXPECTED  Note:   Patient wearing SCDs and understands the purpose.      Problem: Pain Management  Goal: Pain level will decrease to patient's comfort goal  Outcome: PROGRESSING AS EXPECTED    Note: Patient understands the need for pain medications and to stay ahead of the pain.

## 2019-11-25 NOTE — ANESTHESIA POSTPROCEDURE EVALUATION
Patient: Antoni Foster    Procedure Summary     Date:  11/25/19 Room / Location:  [place] [address] / SURGERY [facility]    Anesthesia Start:  1136 Anesthesia Stop:  1257    Procedure:  INSERTION, INTRAMEDULLARY LOVE, TIBIA (Right Leg Lower) Diagnosis:  (right tibial fracture)    Surgeon:  Andrew Lund M.D. Responsible Provider:  Jatin Smith III, M.D.    Anesthesia Type:  general ASA Status:  2          Final Anesthesia Type: general  Last vitals  BP   Blood Pressure: 117/77    Temp   36.4 °C (97.6 °F)    Pulse   Pulse: 70   Resp   18    SpO2   93 %      Anesthesia Post Evaluation    Patient location during evaluation: PACU  Patient participation: complete - patient participated  Level of consciousness: awake and alert  Pain score: 5    Airway patency: patent  Anesthetic complications: no  Cardiovascular status: hemodynamically stable  Respiratory status: acceptable  Hydration status: euvolemic    PONV: none           Nurse Pain Score: 5 (NPRS)

## 2019-11-25 NOTE — OR NURSING
Pt AA/Ox4. VSS. Dressing to RLE, CDI. CMS+. Pt wiggles right toes. Pt reports 3/10 pain scale at this time. Femoral nerve block administered by anesthesiologist. Pain medications given. RLE elevated on a pillow and ice pack applied. Pt denies nausea. SCD to LLE.    Report given to NIKOLE Castrejon. Pt's SOLucía, updated regarding plan of care.     Awaiting transport.

## 2019-11-25 NOTE — PROGRESS NOTES
2 RN Skin Check    2 RN skin check complete upon admission.   Devices in place: SCDs and Nasal Cannula.  Skin assessed under devices: yes.  No confirmed or new potential pressure ulcers noted.  The following interventions in place: Pillows and Lotion.    Skin intact overall. Blanchable redness to right knee and bilateral ears. Unable to assess right lower leg due to splint.

## 2019-11-25 NOTE — OR NURSING
Pt via bed, accompanied by transport, was transferred to [facility] at 1418.

## 2019-11-25 NOTE — CARE PLAN
Problem: Communication  Goal: The ability to communicate needs accurately and effectively will improve  Outcome: PROGRESSING AS EXPECTED   Patient able to effectively communicate needs.    Problem: Safety  Goal: Will remain free from falls  Outcome: PROGRESSING AS EXPECTED  Fall precautions in place. Patient calls appropriately for assistance.

## 2019-11-26 VITALS
DIASTOLIC BLOOD PRESSURE: 76 MMHG | RESPIRATION RATE: 16 BRPM | BODY MASS INDEX: 34.54 KG/M2 | WEIGHT: 255 LBS | SYSTOLIC BLOOD PRESSURE: 125 MMHG | HEART RATE: 72 BPM | OXYGEN SATURATION: 92 % | HEIGHT: 72 IN | TEMPERATURE: 98 F

## 2019-11-26 PROCEDURE — 97165 OT EVAL LOW COMPLEX 30 MIN: CPT

## 2019-11-26 PROCEDURE — 97161 PT EVAL LOW COMPLEX 20 MIN: CPT

## 2019-11-26 PROCEDURE — G0378 HOSPITAL OBSERVATION PER HR: HCPCS

## 2019-11-26 PROCEDURE — A9270 NON-COVERED ITEM OR SERVICE: HCPCS | Performed by: SURGERY

## 2019-11-26 PROCEDURE — 700102 HCHG RX REV CODE 250 W/ 637 OVERRIDE(OP): Performed by: SURGERY

## 2019-11-26 RX ORDER — OXYCODONE AND ACETAMINOPHEN 10; 325 MG/1; MG/1
1 TABLET ORAL EVERY 4 HOURS PRN
Qty: 80 TAB | Refills: 0 | Status: SHIPPED | OUTPATIENT
Start: 2019-11-26 | End: 2019-12-11

## 2019-11-26 RX ORDER — DOCUSATE SODIUM 100 MG/1
100 CAPSULE, LIQUID FILLED ORAL 2 TIMES DAILY
Qty: 60 CAP | Refills: 0 | Status: SHIPPED | OUTPATIENT
Start: 2019-11-26

## 2019-11-26 RX ORDER — ASPIRIN 325 MG
325 TABLET ORAL 2 TIMES DAILY
Qty: 60 TAB | Refills: 0 | Status: SHIPPED | OUTPATIENT
Start: 2019-11-26

## 2019-11-26 RX ADMIN — OXYCODONE HYDROCHLORIDE 10 MG: 10 TABLET ORAL at 10:32

## 2019-11-26 RX ADMIN — ACETAMINOPHEN 1000 MG: 500 TABLET ORAL at 08:35

## 2019-11-26 RX ADMIN — CELECOXIB 200 MG: 200 CAPSULE ORAL at 04:31

## 2019-11-26 RX ADMIN — ACETAMINOPHEN 1000 MG: 500 TABLET ORAL at 04:31

## 2019-11-26 ASSESSMENT — COGNITIVE AND FUNCTIONAL STATUS - GENERAL
MOVING TO AND FROM BED TO CHAIR: A LITTLE
MOBILITY SCORE: 18
STANDING UP FROM CHAIR USING ARMS: A LITTLE
HELP NEEDED FOR BATHING: A LITTLE
SUGGESTED CMS G CODE MODIFIER MOBILITY: CK
DAILY ACTIVITIY SCORE: 22
DRESSING REGULAR LOWER BODY CLOTHING: A LITTLE
WALKING IN HOSPITAL ROOM: A LITTLE
CLIMB 3 TO 5 STEPS WITH RAILING: A LITTLE
TURNING FROM BACK TO SIDE WHILE IN FLAT BAD: A LITTLE
MOVING FROM LYING ON BACK TO SITTING ON SIDE OF FLAT BED: A LITTLE
SUGGESTED CMS G CODE MODIFIER DAILY ACTIVITY: CJ

## 2019-11-26 ASSESSMENT — GAIT ASSESSMENTS
DEVIATION: ANTALGIC
ASSISTIVE DEVICE: FRONT WHEEL WALKER
GAIT LEVEL OF ASSIST: SUPERVISED
DISTANCE (FEET): 50

## 2019-11-26 ASSESSMENT — ACTIVITIES OF DAILY LIVING (ADL): TOILETING: INDEPENDENT

## 2019-11-26 NOTE — CARE PLAN
Problem: Communication  Goal: The ability to communicate needs accurately and effectively will improve  Outcome: PROGRESSING AS EXPECTED  Note:   Patient able to voice needs.      Problem: Safety  Goal: Will remain free from falls  Outcome: PROGRESSING AS EXPECTED  Note:   Patient understands to ask for assistance when he wants to get up. All appropriate fall pre-cautions in place.

## 2019-11-26 NOTE — PROGRESS NOTES
Delivered crutches to patient. Provided instructions on how to adjust crutches. Any questions, please call traction at [phone].

## 2019-11-26 NOTE — CARE PLAN
Problem: Communication  Goal: The ability to communicate needs accurately and effectively will improve  Outcome: PROGRESSING AS EXPECTED   Call bell within reach; pt makes needs known      Problem: Safety  Goal: Will remain free from falls  Outcome: PROGRESSING AS EXPECTED   Pt has remained free from falls       Problem: Pain Management  Goal: Pain level will decrease to patient's comfort goal  Outcome: PROGRESSING AS EXPECTED   Prn pain meds on board

## 2019-11-26 NOTE — OP REPORT
DATE OF SERVICE:  11/25/2019    PREOPERATIVE DIAGNOSES:  1.  Right tibial shaft fracture.  2.  Right posterior malleolus fracture.  3.  Right proximal fibular fracture.    POSTOPERATIVE DIAGNOSES:  1.  Right tibial shaft fracture.  2.  Right posterior malleolus fracture.  3.  Right proximal fibular fracture.    PROCEDURES:  1.  Intramedullary nail, right tibia.  2.  Open reduction and internal fixation, right posterior malleolus fracture.  3.  Closed treatment of right proximal fibular fracture.    NAME OF SURGEON:  Andrew Lund MD    ASSISTANT:  Carlos Alberto Merritt MD    ESTIMATED BLOOD LOSS:  Minimal.    INDICATIONS:  This is a gentleman status post tib-fib fracture who has   presented for operative fixation.  Risks and benefits were discussed at   length, which include but not limited to bleeding, infection, neurovascular   damage, pain, stiffness, malunion, nonunion, DVT, PE, MI, stroke, and death.    They understand all these risks and wished to proceed.    DESCRIPTION OF PROCEDURE:  The patient was sedated with LMA anesthesia and   administered preoperative antibiotics.  His right lower extremity was prepped   and draped in the usual sterile fashion.  A standard 2 cm incision was made   superolateral to the patella and a cannula was utilized to gain access to the   tibial starting point while protecting the trochlear groove.  A guide pin was   placed in the appropriate starting point and anterior reamer was utilized.  A   guidewire was passed across the fracture site into a center-center position in   the ankle and the canal was reamed to 11.5 mm.  A 10x340 OIC tibial nail was   inserted to the appropriate depth.  Two proximal and 2 distal cross locks   screws were placed.  The posterior malleolar fragment was then reduced and   held with two 4.0 cannulated OIC screws.  Wounds were irrigated, closed with   2-0 Vicryl suture and staples.  The proximal fibula was well aligned and as a   result treated  nonoperatively.  Posterior splint was applied.  The patient   tolerated the procedure well.    POSTOPERATIVE PLAN:  The patient to be admitted overnight for pain control,   perioperative antibiotics, and discharged home in the morning when stable.       ____________________________________     ELIAS PATINO MD PLA / DIANA    DD:  11/26/2019 07:00:18  DT:  11/26/2019 07:14:31    D#:  [id number]  Job#:  [id number]

## 2019-11-26 NOTE — THERAPY
"Occupational Therapy Evaluation completed.   Functional Status: Supv supine > EOB, transfers with FWW supv, LB dressing min A without AE  Plan of Care: Patient with no further skilled OT needs in the acute care setting at this time  Discharge Recommendations:  Equipment: No needs from OT standpoint. Defer to PT for AD. Post-acute therapy: Anticipate that the patient will have no further occupational therapy needs after discharge from the hospital.     See \"Rehab Therapy-Acute\" Patient Summary Report for complete documentation.    53 y.o. male s/o fall at [employer]. Pt sustained R posterior malleolus fx, R tib/fib fx. Pt underwent ORIF to ankle and IM nail to tibia. Now NWB. Seen for OT eval. Pt able to don underpants and pants without assist. Required min A for L sock due to unable to tailor sit onto RLE. Declined training on use of sock-aid stating he prefers assist from spouse. Pt has built-in shower chair in stall shower at home. Agreeable to use with close supv for shower transfers. Pt is completing transfers with supv using FWW. Reports he has walker on loan to friend and is in process of retrieving it. Pt has support from spouse as needed. No further acute OT needs at this time.  "
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
## 2019-11-26 NOTE — PROGRESS NOTES
POD#1  S/P IMN tibia, ORIF posterior mallelous  TTWB RLE x 6 weeks  PT/OT  SCDs  DC home today if pain ok
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
## 2019-11-26 NOTE — PROGRESS NOTES
Patient left the floor and being discharged home . Escorted to car via wheelchair by transport. Patient's daughter driving him home.

## 2019-11-26 NOTE — DISCHARGE INSTRUCTIONS
Discharge Instructions    Discharged to home by car with relative. Discharged via wheelchair, hospital escort: Yes.  Special equipment needed: Crutches    Be sure to schedule a follow-up appointment with your primary care doctor or any specialists as instructed.     Discharge Plan:   Smoking Cessation Offered: Patient Refused  Influenza Vaccine Indication: Not indicated: Previously immunized this influenza season and > 8 years of age    I understand that a diet low in cholesterol, fat, and sodium is recommended for good health. Unless I have been given specific instructions below for another diet, I accept this instruction as my diet prescription.   Other diet: regular    Special Instructions: Discharge instructions for the Orthopedic Patient    Follow up with Primary Care Physician within 2 weeks of discharge to home, regarding:  Review of medications and diagnostic testing.  Surveillance for medical complications.  Workup and treatment of osteoporosis, if appropriate.     -Is this a Joint Replacement patient? No    -Is this patient being discharged with medication to prevent blood clots?  Yes, Aspirin Aspirin, ASA oral tablets  What is this medicine?  ASPIRIN (AS pir in) is a pain reliever. It is used to treat mild pain and fever. This medicine is also used as directed by a doctor to prevent and to treat heart attacks, to prevent strokes, and to treat arthritis or inflammation.  This medicine may be used for other purposes; ask your health care provider or pharmacist if you have questions.  COMMON BRAND NAME(S): Aspir-Low, Aspir-Liudmila, Aspirtab, Viola Advanced Aspirin, Viola Aspirin, Viola Aspirin Extra Strength, Viola Aspirin Plus, Viola Extra Strength, Viola Extra Strength Plus, Viola Genuine Aspirin, Viola Womens Aspirin, Bufferin, Bufferin Extra Strength, Bufferin Low Dose  What should I tell my health care provider before I take this medicine?  They need to know if you have any of these  conditions:  -anemia  -asthma  -bleeding problems  -child with chickenpox, the flu, or other viral infection  -diabetes  -gout  -if you frequently drink alcohol containing drinks  -kidney disease  -liver disease  -low level of vitamin K  -lupus  -smoke tobacco  -stomach ulcers or other problems  -an unusual or allergic reaction to aspirin, tartrazine dye, other medicines, dyes, or preservatives  -pregnant or trying to get pregnant  -breast-feeding  How should I use this medicine?  Take this medicine by mouth with a glass of water. Follow the directions on the package or prescription label. You can take this medicine with or without food. If it upsets your stomach, take it with food. Do not take your medicine more often than directed.  Talk to your pediatrician regarding the use of this medicine in children. While this drug may be prescribed for children as young as 12 years of age for selected conditions, precautions do apply. Children and teenagers should not use this medicine to treat chicken pox or flu symptoms unless directed by a doctor.  Patients over 65 years old may have a stronger reaction and need a smaller dose.  Overdosage: If you think you have taken too much of this medicine contact a poison control center or emergency room at once.  NOTE: This medicine is only for you. Do not share this medicine with others.  What if I miss a dose?  If you are taking this medicine on a regular schedule and miss a dose, take it as soon as you can. If it is almost time for your next dose, take only that dose. Do not take double or extra doses.  What may interact with this medicine?  Do not take this medicine with any of the following medications:  -cidofovir  -ketorolac  -probenecid  This medicine may also interact with the following medications:  -alcohol  -alendronate  -bismuth subsalicylate  -flavocoxid  -herbal supplements like feverfew, garlic, brett, ginkgo biloba, horse chestnut  -medicines for diabetes or  glaucoma like acetazolamide, methazolamide  -medicines for gout  -medicines that treat or prevent blood clots like enoxaparin, heparin, ticlopidine, warfarin  -other aspirin and aspirin-like medicines  -NSAIDs, medicines for pain and inflammation, like ibuprofen or naproxen  -pemetrexed  -sulfinpyrazone  -varicella live vaccine  This list may not describe all possible interactions. Give your health care provider a list of all the medicines, herbs, non-prescription drugs, or dietary supplements you use. Also tell them if you smoke, drink alcohol, or use illegal drugs. Some items may interact with your medicine.  What should I watch for while using this medicine?  If you are treating yourself for pain, tell your doctor or health care professional if the pain lasts more than 10 days, if it gets worse, or if there is a new or different kind of pain. Tell your doctor if you see redness or swelling. Also, check with your doctor if you have a fever that lasts for more than 3 days. Only take this medicine to prevent heart attacks or blood clotting if prescribed by your doctor or health care professional.  Do not take aspirin or aspirin-like medicines with this medicine. Too much aspirin can be dangerous. Always read the labels carefully.  This medicine can irritate your stomach or cause bleeding problems. Do not smoke cigarettes or drink alcohol while taking this medicine. Do not lie down for 30 minutes after taking this medicine to prevent irritation to your throat.  If you are scheduled for any medical or dental procedure, tell your healthcare provider that you are taking this medicine. You may need to stop taking this medicine before the procedure.  This medicine may be used to treat migraines. If you take migraine medicines for 10 or more days a month, your migraines may get worse. Keep a diary of headache days and medicine use. Contact your healthcare professional if your migraine attacks occur more frequently.  What  side effects may I notice from receiving this medicine?  Side effects that you should report to your doctor or health care professional as soon as possible:  -allergic reactions like skin rash, itching or hives, swelling of the face, lips, or tongue  -breathing problems  -changes in hearing, ringing in the ears  -confusion  -general ill feeling or flu-like symptoms  -pain on swallowing  -redness, blistering, peeling or loosening of the skin, including inside the mouth or nose  -signs and symptoms of bleeding such as bloody or black, tarry stools; red or dark-brown urine; spitting up blood or brown material that looks like coffee grounds; red spots on the skin; unusual bruising or bleeding from the eye, gums, or nose  -trouble passing urine or change in the amount of urine  -unusually weak or tired  -yellowing of the eyes or skin  Side effects that usually do not require medical attention (report to your doctor or health care professional if they continue or are bothersome):  -diarrhea or constipation  -headache  -nausea, vomiting  -stomach gas, heartburn  This list may not describe all possible side effects. Call your doctor for medical advice about side effects. You may report side effects to FDA at 8-009-FDA-0072.  Where should I keep my medicine?  Keep out of the reach of children.  Store at room temperature between 15 and 30 degrees C (59 and 86 degrees F). Protect from heat and moisture. Do not use this medicine if it has a strong vinegar smell. Throw away any unused medicine after the expiration date.  NOTE: This sheet is a summary. It may not cover all possible information. If you have questions about this medicine, talk to your doctor, pharmacist, or health care provider.  © 2018 Elsevier/Gold Standard (2014-08-19 11:30:31)      · Is patient discharged on Warfarin / Coumadin?   No       Tibial Fracture, Adult  A tibial fracture is a break in the larger bone of your lower leg (tibia). This bone is also called  the shin bone.  What are the causes?  · Low-energy injuries, such as a fall from ground level.  · High-energy injuries, such as motor vehicle injuries or high-speed sports collisions.  What increases the risk?  · Jumping activities.  · Repetitive stress, such as long-distance running.  · Participation in sports.  · Osteoporosis.  · Advanced age.  What are the signs or symptoms?  · Pain.  · Swelling.  · Inability to put weight on your injured leg.  · Bone deformities at the site of your injury.  · Bruising.  How is this diagnosed?  A tibial fracture can usually be diagnosed using X-rays.  How is this treated?  A tibial fracture will often be treated with simple immobilization. A cast or splint will be used on your leg to keep it from moving while it heals. If the injury caused parts of the bone to move out of place, your health care provider may reposition those parts before putting on your cast or splint. The cast or splint will remain in place until your health care provider thinks the bone has healed well enough. Then you can begin range-of-motion exercises to regain your knee motion. For severe injuries, surgery is sometimes needed to insert plates or screws into the injured area.  Follow these instructions at home:  · If you have a plaster or fiberglass cast:  ¨ Do not try to scratch the skin under the cast using sharp or pointed objects.  ¨ Check the skin around the cast every day. You may put lotion on any red or sore areas.  ¨ Keep your cast dry and clean.  · If you have a plaster splint:  ¨ Wear the splint as directed.  ¨ Loosen the elastic around the splint if your toes become numb, tingle, or turn cold or blue.  · Do not put pressure on any part of your cast or splint until it is fully hardened.  · Use a plastic bag to protect your cast or splint during bathing. Do not lower the cast or splint into water.  · Use crutches as directed.  · Take medicines only as directed by your health care provider.  · Keep  all follow-up visits as directed by your health care provider. This is important.  Contact a health care provider if:  · Your pain is becoming worse rather than better or is not controlled with medicines.  · You have increased swelling or redness in your foot.  · You begin to lose feeling in your foot or toes.  Get help right away if:  · Your foot or toes on the injured side feel cold or turn blue.  · You develop severe pain in your injured leg, especially if the pain is increased with movement of your toes.  This information is not intended to replace advice given to you by your health care provider. Make sure you discuss any questions you have with your health care provider.  Document Released: 09/12/2002 Document Revised: 08/20/2017 Document Reviewed: 02/11/2015  [employer] Interactive Patient Education © 2017 [employer]    Splint Care  Your doctor has applied a splint to rest and protect your injury. Splints can be made of plaster, fiberglass, or metal; they are used to treat fractures, sprains, tendonitis, and other injuries. Please keep your injury elevated to reduce swelling and pressure under your splint. If an elastic bandage has been used to hold the splint, it can be loosened for increased swelling or pain.  Try to keep your splint clean and dry. They can be used for weeks if needed to treat serious sprains, or minor fractures. Do not put objects under your splint to scratch yourself. Call your doctor right away if you have:  · Increased pain or pressure around the injury.   · Numbness, tingling, or painful, cool toes or fingers.   Call your doctor for follow up care as recommended, especially if your splint becomes too soft or broken before you are healed.  Document Released: 01/25/2006 Document Revised: 03/11/2013 Document Reviewed: 12/04/2008  ExitCare® Patient Information ©2013 [employer].    Intramedullary Nailing of Tibial Diaphyseal Fracture, Care After  Refer to this sheet in the next few  weeks. These instructions provide you with information on caring for yourself after your procedure. Your health care provider may also give you more specific instructions. Your treatment has been planned according to current medical practices, but problems sometimes occur. Call your health care provider if you have any problems or questions after your procedure.  WHAT TO EXPECT AFTER THE PROCEDURE   After your procedure, it is typical to have the following:  · Pain.  · Swelling. The swelling will go down in a few days to a few weeks.  HOME CARE INSTRUCTIONS  It may take 4-6 months to fully recover from this procedure and the injury it is used to treat. It is very important that you follow directions carefully. Proper healing is necessary to prevent future problems, such as arthritis. The following home care instructions can help you during the healing process:  · Take all medicines as directed by your health care provider.  · Apply ice to your leg if instructed by your health care provider:  ¨ Put ice in a plastic bag.  ¨ Place a towel between your skin and the bag.  ¨ Leave the ice on for 20 minutes, 2-3 times a day.  · Keep your leg elevated on pillows above the level of your chest when you are resting, as directed by your health care provider.  · Change bandages as directed by your health care provider.  · You may resume a normal diet. Make sure to eat foods with calcium and vitamin D to support bone growth. These can include:  ¨ Dairy and non-dairy milks, yogurts, and cheeses.  ¨ Dark leafy greens.  ¨ Sardines.  ¨ Fortified juices.  ¨ Hot and cold cereals.  · Drink 6-8 glasses of water daily to avoid constipation. Drink enough fluid to keep your urine clear or pale yellow.  · Move around as tolerated, but rest often for the first few days.  · Early motion of other parts of your leg such as the knee, ankle, and toes may be recommended. Perform exercises as directed.  · You may be able to put weight on your leg  right away. Your health care provider will let you know when you can begin putting gentle weight on the leg.  · Use crutches or a walker as directed by your health care provider.  · Do not drive or operate heavy machinery until directed by your health care provider.  · Remove throw rugs in your home to prevent slips and falls. Avoid walking on bumpy surfaces outside.  · Avoid activity and situations that could result in a slip or fall or trauma to the leg.  · Follow your health care provider's instructions about when it is safe to return to work.  · Follow up with a physical therapist as directed.  · Schedule and attend follow-up visits as directed by your health care provider. It is important to keep all your appointments.  SEEK IMMEDIATE MEDICAL CARE IF:  · You develop rapidly increasing redness, swelling, or pain around your incision.  · There is pus coming from your incision.  · There is a bad smell is coming from the incision or bandage.  · Your incision breaks open.  · You have a fever.  · You have severe pain in your injured leg that is not controlled by medicine.  · You have red, painful, or swollen areas in one or both legs.     This information is not intended to replace advice given to you by your health care provider. Make sure you discuss any questions you have with your health care provider.     Document Released: 12/23/2014 Document Revised: 01/08/2016 Document Reviewed: 12/23/2014  [employer] Interactive Patient Education ©2016 [employer]    Depression / Suicide Risk    As you are discharged from this [facility] facility, it is important to learn how to keep safe from harming yourself.    Recognize the warning signs:  · Abrupt changes in personality, positive or negative- including increase in energy   · Giving away possessions  · Change in eating patterns- significant weight changes-  positive or negative  · Change in sleeping patterns- unable to sleep or sleeping all the time   · Unwillingness  or inability to communicate  · Depression  · Unusual sadness, discouragement and loneliness  · Talk of wanting to die  · Neglect of personal appearance   · Rebelliousness- reckless behavior  · Withdrawal from people/activities they love  · Confusion- inability to concentrate     If you or a loved one observes any of these behaviors or has concerns about self-harm, here's what you can do:  · Talk about it- your feelings and reasons for harming yourself  · Remove any means that you might use to hurt yourself (examples: pills, rope, extension cords, firearm)  · Get professional help from the community (Mental Health, Substance Abuse, psychological counseling)  · Do not be alone:Call your Safe Contact- someone whom you trust who will be there for you.  · Call your local CRISIS HOTLINE 573-8692 or 949-525-4642  · Call your local Children's Mobile Crisis Response Team [place] (534) 082-5189 or www.[employer]  · Call the toll free National Suicide Prevention Hotlines   · National Suicide Prevention Lifeline 622-455-OIRY (8043)  · National Hope Line Network 800-SUICIDE (516-7536)

## 2019-11-26 NOTE — DISCHARGE PLANNING
"Anticipated Discharge Disposition: Home/Home Health    Action: Spoke with pt at bedside, pt states that he lives independently in a single story home with his wife. Pt states that he is doing well after surgery yesterday, and will go home today or tomorrow, as he \"proved\" to PT that he can manage. Pt states that he has a wheelchair and walker at home. Pt is self employed, has insurance and Rx. Pt Emergency Contact is Lucía Weller (Wife) [phone].    Barriers to Discharge: Medical Clearance/H.H acceptance.    Plan: F/U with medical team, pt, CCA        Care Transition Team Assessment    Information Source  Orientation : Oriented x 4  Information Given By: Patient  Who is responsible for making decisions for patient? : Patient    Readmission Evaluation  Is this a readmission?: No    Elopement Risk  Legal Hold: No  Ambulatory or Self Mobile in Wheelchair: No-Not an Elopement Risk  Elopement Risk: Not at Risk for Elopement    Interdisciplinary Discharge Planning  Lives with - Patient's Self Care Capacity: Spouse  Patient or legal guardian wants to designate a caregiver (see row info): No  Housing / Facility: [address]  Prior Services: None    Discharge Preparedness  What is your plan after discharge?: Home with help  What are your discharge supports?: Spouse  Prior Functional Level: Independent with Activities of Daily Living    Functional Assesment  Prior Functional Level: Independent with Activities of Daily Living    Finances  Financial Barriers to Discharge: No  Prescription Coverage: Yes    Vision / Hearing Impairment  Vision Impairment : No  Hearing Impairment : No         Advance Directive  Advance Directive?: None    Domestic Abuse  Have you ever been the victim of abuse or violence?: No  Physical Abuse or Sexual Abuse: No  Verbal Abuse or Emotional Abuse: No  Possible Abuse Reported to:: Not Applicable    Psychological Assessment  History of Substance Abuse: None  History of Psychiatric Problems: " No  Non-compliant with Treatment: No  Newly Diagnosed Illness: No    Discharge Risks or Barriers  Discharge risks or barriers?: No    Anticipated Discharge Information  Anticipated discharge disposition: Home  Discharge Address: on file  Discharge Contact Phone Number: on file

## 2019-11-27 ENCOUNTER — PATIENT OUTREACH (OUTPATIENT)
Dept: HEALTH INFORMATION MANAGEMENT | Facility: OTHER | Age: 54
End: 2019-11-27

## 2019-11-27 SDOH — ECONOMIC STABILITY: TRANSPORTATION INSECURITY
IN THE PAST 12 MONTHS, HAS THE LACK OF TRANSPORTATION KEPT YOU FROM MEDICAL APPOINTMENTS OR FROM GETTING MEDICATIONS?: NO

## 2019-11-27 SDOH — ECONOMIC STABILITY: FOOD INSECURITY: WITHIN THE PAST 12 MONTHS, THE FOOD YOU BOUGHT JUST DIDN'T LAST AND YOU DIDN'T HAVE MONEY TO GET MORE.: NEVER TRUE

## 2019-11-27 SDOH — ECONOMIC STABILITY: FOOD INSECURITY: WITHIN THE PAST 12 MONTHS, YOU WORRIED THAT YOUR FOOD WOULD RUN OUT BEFORE YOU GOT MONEY TO BUY MORE.: NEVER TRUE

## 2019-11-27 SDOH — ECONOMIC STABILITY: INCOME INSECURITY: HOW HARD IS IT FOR YOU TO PAY FOR THE VERY BASICS LIKE FOOD, HOUSING, MEDICAL CARE, AND HEATING?: NOT HARD AT ALL

## 2019-11-27 SDOH — ECONOMIC STABILITY: TRANSPORTATION INSECURITY
IN THE PAST 12 MONTHS, HAS LACK OF TRANSPORTATION KEPT YOU FROM MEETINGS, WORK, OR FROM GETTING THINGS NEEDED FOR DAILY LIVING?: NO
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
## 2019-11-27 NOTE — PROGRESS NOTES
Community Health Worker Intake  • Social determinates of health intake complete.   • Contact information provided to Antoni Cristian.    11/27 MARTINA Bal spoke to pt via telephone to offer CCM services. Pt indicated he has a PCP in [place] and has no issues with transporation getting to medical appointments and picking up medication. Pt expressed he as no trouble paying for resources (food, housing, medical care). Pt expressed he will be calling  to get assistance for help around the house. Pt indicated he has a good support system; his granddaughter and wife are helping him recover. Pt indicated he lives in a single story home and that he is confident in managing his help. Pt indicated he is doing well and does not need CCM services at this time. Pt indicated he picked up his medications after discharge. MARTINA Bal reminded pt to set up an appointment with [facility], but pt will schedule an appointment with  in [place] because he has medi-tremaine. CHW asked pt if he needed assistance with anything else and he said he is doing fine. Pt met all goals and will d/c from CCM services.

## (undated) DEVICE — KIT ROOM DECONTAMINATION

## (undated) DEVICE — SENSOR SPO2 NEO LNCS ADHESIVE (20/BX) SEE USER NOTES

## (undated) DEVICE — BLADE SURGICAL CLIPPER - (50EA/CA)

## (undated) DEVICE — BLOCK

## (undated) DEVICE — GUIDE ROD R-T BALL TIP 3.0

## (undated) DEVICE — ROD GUIDE BALL TIP 3.0MM X 1000MM

## (undated) DEVICE — NEPTUNE 4 PORT MANIFOLD - (20/PK)

## (undated) DEVICE — GUIDE PIN 1.25X150 THRD OIC - (6EA/BX) (5TX6=30)

## (undated) DEVICE — SET LEADWIRE 5 LEAD BEDSIDE DISPOSABLE ECG (1SET OF 5/EA)

## (undated) DEVICE — SUCTION INSTRUMENT YANKAUER BULBOUS TIP W/O VENT (50EA/CA)

## (undated) DEVICE — WATER IRRIG. STER. 1500 ML - (9/CA)

## (undated) DEVICE — ELECTRODE DUAL RETURN W/ CORD - (50/PK)

## (undated) DEVICE — DRESSING TRANSPARENT FILM TEGADERM 4 X 4.75" (50EA/BX)"

## (undated) DEVICE — SUTURE GENERAL

## (undated) DEVICE — GUIDE PIN CALIBRATED (5EA/PK) (4TX6=24)

## (undated) DEVICE — SUTURE 0 VICRYL PLUS CT-1 - 8 X 18 INCH (12/BX)

## (undated) DEVICE — BIT DRILL LONG CALIBRATED 4.2MM X 330MM (4TX2=8)

## (undated) DEVICE — SODIUM CHL IRRIGATION 0.9% 1000ML (12EA/CA)

## (undated) DEVICE — SPONGE GAUZESTER 4 X 4 4PLY - (128PK/CA)

## (undated) DEVICE — PACK LOWER EXTREMITY - (2/CA)

## (undated) DEVICE — TUBING CLEARLINK DUO-VENT - C-FLO (48EA/CA)

## (undated) DEVICE — SET EXTENSION WITH 2 PORTS (48EA/CA) ***PART #2C8610 IS A SUBSTITUTE*****

## (undated) DEVICE — CANISTER SUCTION 3000ML MECHANICAL FILTER AUTO SHUTOFF MEDI-VAC NONSTERILE LF DISP  (40EA/CA)

## (undated) DEVICE — ELECTRODE 850 FOAM ADHESIVE - HYDROGEL RADIOTRNSPRNT (50/PK)

## (undated) DEVICE — SUTURE 2-0 VICRYL PLUS CT-1 - 8 X 18 INCH(12/BX)

## (undated) DEVICE — DRESSING XEROFORM 1X8 - (50/BX 4BX/CA)

## (undated) DEVICE — PROTECTOR ULNA NERVE - (36PR/CA)

## (undated) DEVICE — BIT DRILL SHORT 4.2MM X 155MM (4TX2=8)

## (undated) DEVICE — LACTATED RINGERS INJ 1000 ML - (14EA/CA 60CA/PF)

## (undated) DEVICE — MASK ANESTHESIA ADULT  - (100/CA)

## (undated) DEVICE — DRAPE LARGE 3 QUARTER - (20/CA)

## (undated) DEVICE — KIT ANESTHESIA W/CIRCUIT & 3/LT BAG W/FILTER (20EA/CA)

## (undated) DEVICE — BANDAGE ELASTIC STERILE MATRIX 6 X 10 (20EA/CA)

## (undated) DEVICE — GOWN WARMING STANDARD FLEX - (30/CA)

## (undated) DEVICE — GLOVE BIOGEL SZ 7.5 SURGICAL PF LTX - (50PR/BX 4BX/CA)

## (undated) DEVICE — DRAPE 36X28IN RAD CARM BND BG - (25/CA) O

## (undated) DEVICE — GLOVE BIOGEL INDICATOR SZ 8 SURGICAL PF LTX - (50/BX 4BX/CA)

## (undated) DEVICE — DRAPE C-ARM LARGE 41IN X 74 IN - (10/BX 2BX/CA)

## (undated) DEVICE — HEAD HOLDER JUNIOR/ADULT

## (undated) DEVICE — PADDING CAST 6 IN STERILE - 6 X 4 YDS (24/CA)